# Patient Record
Sex: MALE | Race: WHITE | NOT HISPANIC OR LATINO | Employment: OTHER | URBAN - METROPOLITAN AREA
[De-identification: names, ages, dates, MRNs, and addresses within clinical notes are randomized per-mention and may not be internally consistent; named-entity substitution may affect disease eponyms.]

---

## 2020-07-18 ENCOUNTER — HOSPITAL ENCOUNTER (EMERGENCY)
Facility: HOSPITAL | Age: 85
Discharge: HOME | End: 2020-07-18
Attending: EMERGENCY MEDICINE | Admitting: EMERGENCY MEDICINE

## 2020-07-18 ENCOUNTER — APPOINTMENT (OUTPATIENT)
Dept: CT IMAGING | Facility: HOSPITAL | Age: 85
End: 2020-07-18

## 2020-07-18 VITALS
TEMPERATURE: 96.9 F | BODY MASS INDEX: 30.38 KG/M2 | RESPIRATION RATE: 20 BRPM | WEIGHT: 217 LBS | HEIGHT: 71 IN | OXYGEN SATURATION: 97 % | SYSTOLIC BLOOD PRESSURE: 164 MMHG | DIASTOLIC BLOOD PRESSURE: 71 MMHG | HEART RATE: 60 BPM

## 2020-07-18 DIAGNOSIS — S16.1XXA CERVICAL STRAIN, ACUTE, INITIAL ENCOUNTER: ICD-10-CM
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
## 2020-07-18 DIAGNOSIS — S09.90XA CLOSED HEAD INJURY, INITIAL ENCOUNTER: ICD-10-CM

## 2020-07-18 PROCEDURE — 25010000002 TDAP 5-2.5-18.5 LF-MCG/0.5 SUSPENSION: Performed by: NURSE PRACTITIONER

## 2020-07-18 PROCEDURE — 90471 IMMUNIZATION ADMIN: CPT | Performed by: NURSE PRACTITIONER

## 2020-07-18 PROCEDURE — 90715 TDAP VACCINE 7 YRS/> IM: CPT | Performed by: NURSE PRACTITIONER

## 2020-07-18 PROCEDURE — 70450 CT HEAD/BRAIN W/O DYE: CPT

## 2020-07-18 PROCEDURE — 72125 CT NECK SPINE W/O DYE: CPT

## 2020-07-18 PROCEDURE — 63710000001 ONDANSETRON ODT 4 MG TABLET DISPERSIBLE: Performed by: NURSE PRACTITIONER

## 2020-07-18 PROCEDURE — 99284 EMERGENCY DEPT VISIT MOD MDM: CPT

## 2020-07-18 RX ORDER — ONDANSETRON 4 MG/1
4 TABLET, ORALLY DISINTEGRATING ORAL ONCE
Status: COMPLETED | OUTPATIENT
Start: 2020-07-18 | End: 2020-07-18

## 2020-07-18 RX ORDER — LISINOPRIL AND HYDROCHLOROTHIAZIDE 25; 20 MG/1; MG/1
1 TABLET ORAL DAILY
COMMUNITY

## 2020-07-18 RX ORDER — TRAMADOL HYDROCHLORIDE 50 MG/1
50 TABLET ORAL EVERY 8 HOURS PRN
Qty: 6 TABLET | Refills: 0 | Status: SHIPPED | OUTPATIENT
Start: 2020-07-18

## 2020-07-18 RX ORDER — HYDROCODONE BITARTRATE AND ACETAMINOPHEN 5; 325 MG/1; MG/1
1 TABLET ORAL ONCE
Status: COMPLETED | OUTPATIENT
Start: 2020-07-18 | End: 2020-07-18

## 2020-07-18 RX ORDER — AMLODIPINE BESYLATE 5 MG/1
5 TABLET ORAL DAILY
COMMUNITY

## 2020-07-18 RX ORDER — ATORVASTATIN CALCIUM 20 MG/1
20 TABLET, FILM COATED ORAL DAILY
COMMUNITY

## 2020-07-18 RX ADMIN — HYDROCODONE BITARTRATE AND ACETAMINOPHEN 1 TABLET: 5; 325 TABLET ORAL at 16:26

## 2020-07-18 RX ADMIN — TETANUS TOXOID, REDUCED DIPHTHERIA TOXOID AND ACELLULAR PERTUSSIS VACCINE, ADSORBED 0.5 ML: 5; 2.5; 8; 8; 2.5 SUSPENSION INTRAMUSCULAR at 16:59

## 2020-07-18 RX ADMIN — ONDANSETRON 4 MG: 4 TABLET, ORALLY DISINTEGRATING ORAL at 16:26

## 2020-07-18 NOTE — ED NOTES
Patient reports that he fell down the steps Thursday night and hit his head. Patient reports that he is having neck pain @ this time. Patient denies any LOC. Patient denies being on a blood thinner. Patient is ambulatory to triage desk. Patient placed in C-Collar. Patient in mask. Triage staff in mask and goggles.     Cesia Hendrickson RN  07/18/20 [id number]

## 2020-07-18 NOTE — DISCHARGE INSTRUCTIONS
Home to rest  Warm compresses to the area.  Pain medication as needed, sparingly. Caution. No driving on this medication.  Return if worse or new concerns   Continue care with your primary care physician and have your blood pressure regularly checked and managed. Normal blood pressure is 120/80.

## 2020-07-18 NOTE — ED PROVIDER NOTES
EMERGENCY DEPARTMENT ENCOUNTER    Room Number:  07/07  Date of encounter:  7/18/2020  PCP: System, Provider Not In  Historian: patient   Full history not obtainable due to: none     HPI:  Chief Complaint: Neck pain     Context: Brandt Preciado Jr. is a 85 y.o. male who presents to the ED c/o neck pain onset 2 days prior after tripping and falling down 8 stairs. Hit the back of his head, denies syncope. Reports neck pain x 2 days. Constant, achy pain. Non radiating. Worse with movement. Tried tylenol which alleviated the pain. No paresthesias or weakness of upper extremities. No blurred or double vision. no headache. No vomiting. Not anticoagulated.     Associated wound to the scalp. Tdap not utd in last 5 years.     No prior neck surgery. No hx of chronic neck pain.      PAST MEDICAL HISTORY    Active Ambulatory Problems     Diagnosis Date Noted   • No Active Ambulatory Problems     Resolved Ambulatory Problems     Diagnosis Date Noted   • No Resolved Ambulatory Problems     Past Medical History:   Diagnosis Date   • Colon cancer (CMS/HCC)    • Diabetes mellitus (CMS/HCC)    • Hyperlipidemia    • Hypertension    • Prostate cancer (CMS/HCC)          PAST SURGICAL HISTORY  Past Surgical History:   Procedure Laterality Date   • COLON RESECTION     • JOINT REPLACEMENT     • NASAL SEPTUM SURGERY           FAMILY HISTORY  History reviewed. No pertinent family history.      SOCIAL HISTORY  Social History     Socioeconomic History   • Marital status:      Spouse name: Not on file   • Number of children: Not on file   • Years of education: Not on file   • Highest education level: Not on file   Tobacco Use   • Smoking status: Never Smoker   • Smokeless tobacco: Never Used   Substance and Sexual Activity   • Alcohol use: Yes     Comment: socially   • Drug use: Never   • Sexual activity: Defer         ALLERGIES  Penicillins        REVIEW OF SYSTEMS  Review of Systems   All systems reviewed and marked as negative except  as listed in HPI       PHYSICAL EXAM    I have reviewed the triage vital signs and nursing notes.    ED Triage Vitals [07/18/20 1519]   Temp Heart Rate Resp BP SpO2   96.9 °F (36.1 °C) 72 16 171/74 97 %      Temp src Heart Rate Source Patient Position BP Location FiO2 (%)   Tympanic Monitor Standing Left arm --       GENERAL: alert well developed, well nourished in no distress, sitting semi fowlers with c collar in place   HENT: NC, neck supple, trachea midline. Abrasion to occiput.   EYES: no scleral icterus, PERRL, normal conjunctiva  CV: regular rhythm, regular rate, no murmur  RESPIRATORY: unlabored effort, CTAB  ABDOMEN: soft, non-tender, non-distended, bowel sounds present  MUSCULOSKELETAL: no gross deformity. Tenderness to c1-c2. No step off. Upper extremity strength intact.   NEURO: alert,  sensory and motor function of extremities grossly intact, speech clear, mental status normal/baseline  SKIN: warm, dry, no rash  PSYCH:  Appropriate mood and affect    Vital signs and nursing notes reviewed.      RADIOLOGY  Ct Head Without Contrast    Result Date: 7/18/2020  CT HEAD WITHOUT CONTRAST, CT CERVICAL SPINE WITHOUT CONTRAST  HISTORY: 85-year-old male with neck pain. Onset 2 days ago after tripping and falling down stairs. Patient hit back of head.  TECHNIQUE: Head CT includes axial imaging from the base of the skull to the vertex without IV contrast. CT cervical spine includes axial imaging from the skull base to upper thoracic spine and this data was reconstructed in coronal and sagittal planes. Radiation dose reduction techniques were utilized, including automated exposure control and exposure modulation based on body size.  FINDINGS: HEAD CT: There are no abnormal areas of increased attenuation intra-axially to suggest hemorrhage. No extra-axial fluid collection is observed. There is no evidence for cerebral edema or mass effect or shift of midline structures. Bone windows demonstrate no calvarial fracture.  Mastoid air cells appear clear. No paranasal sinus air-fluid level is evident.  CERVICAL SPINE: There is bony fusion of the C3-C4-C5 vertebral bodies and posterior elements. A 2 mm anterolisthesis is present at C5 with respect to C6 and C6 with respect to C7. There is subtle anterior wedging with diminished vertebral body height at C6. No fracture plane is evident and this is suspected to be chronic. There is 3 mm anterolisthesis C6 with respect to C7. Multilevel uncovertebral overgrowth is present. There is evidence for osseous encroachment of the neural foramina that appears greatest on the right at C3-4 and C4-5. No acute fracture or acute osseous abnormality is evident. There is multilevel advanced facet arthritis.      1. No evidence for acute intracranial abnormality. 2. 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 Bony fusion at C3-C4-C5. Anterior wedging with diminished height of the C6 vertebral body anteriorly which is most likely chronic and no fracture plane is evident. No evidence for acute abnormality of the cervical spine.  This report was finalized on 7/18/2020 6:54 PM by Dr. Mike Clarke M.D.      Ct Cervical Spine Without Contrast    Result Date: 7/18/2020  CT HEAD WITHOUT CONTRAST, CT CERVICAL SPINE WITHOUT CONTRAST  HISTORY: 85-year-old male with neck pain. Onset 2 days ago after tripping and falling down stairs. Patient hit back of head.  TECHNIQUE: Head CT includes axial imaging from the base of the skull to the vertex without IV contrast. CT cervical spine includes axial imaging from the skull base to upper thoracic spine and this data was reconstructed in coronal and sagittal planes. Radiation dose reduction techniques were utilized, including automated exposure control and exposure modulation based on body size.  FINDINGS: HEAD CT: There are no abnormal areas of increased attenuation intra-axially to suggest hemorrhage. No extra-axial fluid  collection is observed. There is no evidence for cerebral edema or mass effect or shift of midline structures. Bone windows demonstrate no calvarial fracture. Mastoid air cells appear clear. No paranasal sinus air-fluid level is evident.  CERVICAL SPINE: There is bony fusion of the C3-C4-C5 vertebral bodies and posterior elements. A 2 mm anterolisthesis is present at C5 with respect to C6 and C6 with respect to C7. There is subtle anterior wedging with diminished vertebral body height at C6. No fracture plane is evident and this is suspected to be chronic. There is 3 mm anterolisthesis C6 with respect to C7. Multilevel uncovertebral overgrowth is present. There is evidence for osseous encroachment of the neural foramina that appears greatest on the right at C3-4 and C4-5. No acute fracture or acute osseous abnormality is evident. There is multilevel advanced facet arthritis.      1. No evidence for acute intracranial abnormality. 2. 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 Bony fusion at C3-C4-C5. Anterior wedging with diminished height of the C6 vertebral body anteriorly which is most likely chronic and no fracture plane is evident. No evidence for acute abnormality of the cervical spine.  This report was finalized on 7/18/2020 6:54 PM by Dr. Mike Clarke M.D.        I ordered the above noted radiological studies. Independently reviewed by me and discussed with radiologist.  See dictation above for official radiology interpretation.      PROCEDURES    Procedures        MEDICATIONS GIVEN IN ER    Medications   Tdap (BOOSTRIX) injection 0.5 mL (0.5 mL Intramuscular Given 7/18/20 1659)   HYDROcodone-acetaminophen (NORCO) 5-325 MG per tablet 1 tablet (1 tablet Oral Given 7/18/20 1626)   ondansetron ODT (ZOFRAN-ODT) disintegrating tablet 4 mg (4 mg Oral Given 7/18/20 1626)         PROGRESS, DATA ANALYSIS, CONSULTS, AND MEDICAL DECISION MAKING    All labs have been  independently reviewed by me.  All radiology studies have been reviewed by me.   EKG's independently reviewed by me.  Discussion below represents my analysis of pertinent findings related to patient's condition, differential diagnosis, treatment plan and final disposition.    DIFFERENTIAL DIAGNOSIS INCLUDE BUT NOT LIMITED TO: SAH, SDH, skully fracture, cervical strain, cervical fracture, contusion, abrasion. Post concussive syndrome     ED Course as of Jul 18 1907   Sat Jul 18, 2020 1845 I viewed CT of the head on PACS system.  My findings are no midline shift    [JS]   1846 I viewed ct cervical spine on pacs. My findings are no fracture.    [JS]   1905 C collar removed. Pt updated on negative imaging results. Follow up and return precautions discussed. Ready for d/c.     [JS]      ED Course User Index  [JS] Charlette Levine APRN       AS OF 19:07 VITALS:    BP - 171/74  HR - 72  TEMP - 96.9 °F (36.1 °C) (Tympanic)  02 SATS - 97%        DIAGNOSIS  Final diagnoses:   Fall down stairs, initial encounter   Cervical strain, acute, initial encounter   Closed head injury, initial encounter         DISPOSITION  Discharge     Pt masked in first look. I wore a surgical mask throughout my encounters with the pt. I performed hand hygiene on entry into the pt room and upon exit.        Charlette Levine APRN  07/18/20 1907

## 2020-07-18 NOTE — ED PROVIDER NOTES
Pt presents to the ED c/o  fall 2 days ago.  Patient states he was going down the stairs when he tripped and fell landing on his bottom and hitting his head and neck against the stairs.  He denies loss of consciousness.  Since the injury he noted bruising on his buttocks but is able to walk.  He has had increasing pain to his mid and upper neck and mild pain on the back of his head.  He denies numbness, weakness to his arm or legs.  He denies urinary or fecal incontinence.    Patient was placed in face mask in first look. Patient was wearing facemask when I entered the room and throughout our encounter. I wore full protective equipment throughout this patient encounter including a face mask, eye shield and gloves. Hand hygiene was performed before donning protective equipment and after removal when leaving the room.       On exam,   His heart is regular rate and rhythm without murmurs.  His lungs are clear to auscultation bilaterally.  His abdomen is normoactive bowel sounds, soft, nontender nondistended.  His hips are nontender to palpation with full range of motion.  Neurologically, his  strength is 5 out of 5 bilaterally.  His leg strength is 5 out of 5 bilaterally.  His sensation is intact in all 4 extremities.  His cervical spine is in a cervical collar.  He has mild tenderness to his mid and upper C-spine.  His lumbar spine is nontender to palpation.     Plan: I agree with plan of CT of the head and cervical spine.     Attestation:  The [facility] and I have discussed this patient's history, physical exam, and treatment plan.  I have reviewed the documentation and personally had a face to face interaction with the patient. I affirm the documentation and agree with the treatment and plan.  The attached note describes my personal findings.     Dragon disclaimer:   Much of this encounter note is an electronic transcription/translation of spoken language to printed text.  The electronic translation of spoken language  may permit erroneous, or at times, nonsensical words or phrases to be inadvertently transcribed.  Although I have reviewed the note for such areas, some may still exist.     Cole Guy MD  07/18/20 [id number]

## 2020-07-18 NOTE — ED NOTES
rn in mask and goggles at all times when in pt room. Pt and visitor have mask on at all times when this rn is in room     Guero Apodaca RN  07/18/20 1944

## 2020-07-18 NOTE — ED NOTES
Patient was placed in face mask in first look.  Patient was wearing a face mask throughout our encounter.  I wore protective eye protection throughout the encounter.  Hand hygiene was performed before and after patient encounter.        Anselmo Navarro RN  07/18/20 [id number]

## 2021-03-02 DIAGNOSIS — Z23 IMMUNIZATION DUE: ICD-10-CM
